# Patient Record
Sex: FEMALE | Race: OTHER | NOT HISPANIC OR LATINO
[De-identification: names, ages, dates, MRNs, and addresses within clinical notes are randomized per-mention and may not be internally consistent; named-entity substitution may affect disease eponyms.]

---

## 2024-07-22 PROBLEM — M17.11 ARTHRITIS OF KNEE, RIGHT: Status: ACTIVE | Noted: 2024-07-22

## 2024-08-21 PROBLEM — S80.01XA CONTUSION OF RIGHT KNEE, INITIAL ENCOUNTER: Status: ACTIVE | Noted: 2024-08-21

## 2024-11-05 ENCOUNTER — APPOINTMENT (OUTPATIENT)
Dept: ORTHOPEDIC SURGERY | Facility: CLINIC | Age: 64
End: 2024-11-05

## 2024-11-18 ENCOUNTER — APPOINTMENT (OUTPATIENT)
Dept: ORTHOPEDIC SURGERY | Facility: CLINIC | Age: 64
End: 2024-11-18

## 2024-11-21 ENCOUNTER — NON-APPOINTMENT (OUTPATIENT)
Age: 64
End: 2024-11-21

## 2024-12-28 ENCOUNTER — NON-APPOINTMENT (OUTPATIENT)
Age: 64
End: 2024-12-28

## 2024-12-30 ENCOUNTER — APPOINTMENT (OUTPATIENT)
Dept: ORTHOPEDIC SURGERY | Facility: CLINIC | Age: 64
End: 2024-12-30
Payer: OTHER MISCELLANEOUS

## 2024-12-30 VITALS — HEIGHT: 67 IN | BODY MASS INDEX: 39.71 KG/M2 | WEIGHT: 253 LBS

## 2024-12-30 DIAGNOSIS — M17.11 UNILATERAL PRIMARY OSTEOARTHRITIS, RIGHT KNEE: ICD-10-CM

## 2024-12-30 PROCEDURE — 99214 OFFICE O/P EST MOD 30 MIN: CPT | Mod: ACP

## 2024-12-30 RX ORDER — MELOXICAM 15 MG/1
15 TABLET ORAL
Qty: 30 | Refills: 3 | Status: ACTIVE | COMMUNITY
Start: 2024-12-30 | End: 2025-04-29

## 2025-01-27 ENCOUNTER — APPOINTMENT (OUTPATIENT)
Dept: ORTHOPEDIC SURGERY | Facility: CLINIC | Age: 65
End: 2025-01-27

## 2025-01-29 ENCOUNTER — APPOINTMENT (OUTPATIENT)
Dept: ORTHOPEDIC SURGERY | Facility: CLINIC | Age: 65
End: 2025-01-29
Payer: OTHER MISCELLANEOUS

## 2025-01-29 VITALS — BODY MASS INDEX: 39.71 KG/M2 | WEIGHT: 253 LBS | HEIGHT: 67 IN

## 2025-01-29 DIAGNOSIS — S80.01XA CONTUSION OF RIGHT KNEE, INITIAL ENCOUNTER: ICD-10-CM

## 2025-01-29 PROCEDURE — 99214 OFFICE O/P EST MOD 30 MIN: CPT

## 2025-01-29 RX ORDER — MELOXICAM 15 MG/1
15 TABLET ORAL
Qty: 30 | Refills: 1 | Status: ACTIVE | COMMUNITY
Start: 2025-01-29 | End: 1900-01-01

## 2025-02-07 ENCOUNTER — APPOINTMENT (OUTPATIENT)
Dept: ORTHOPEDIC SURGERY | Facility: CLINIC | Age: 65
End: 2025-02-07

## 2025-02-14 ENCOUNTER — APPOINTMENT (OUTPATIENT)
Dept: ORTHOPEDIC SURGERY | Facility: CLINIC | Age: 65
End: 2025-02-14

## 2025-02-28 ENCOUNTER — APPOINTMENT (OUTPATIENT)
Dept: ORTHOPEDIC SURGERY | Facility: CLINIC | Age: 65
End: 2025-02-28
Payer: OTHER MISCELLANEOUS

## 2025-02-28 VITALS — WEIGHT: 253 LBS | BODY MASS INDEX: 39.71 KG/M2 | HEIGHT: 67 IN

## 2025-02-28 DIAGNOSIS — M43.17 SPONDYLOLISTHESIS, LUMBOSACRAL REGION: ICD-10-CM

## 2025-02-28 DIAGNOSIS — S39.012A STRAIN OF MUSCLE, FASCIA AND TENDON OF LOWER BACK, INITIAL ENCOUNTER: ICD-10-CM

## 2025-02-28 PROCEDURE — 99213 OFFICE O/P EST LOW 20 MIN: CPT

## 2025-03-05 ENCOUNTER — INPATIENT (INPATIENT)
Facility: HOSPITAL | Age: 65
LOS: 1 days | Discharge: HOME HEALTH | DRG: 313 | End: 2025-03-07
Attending: INTERNAL MEDICINE | Admitting: INTERNAL MEDICINE
Payer: COMMERCIAL

## 2025-03-05 VITALS
TEMPERATURE: 98 F | SYSTOLIC BLOOD PRESSURE: 138 MMHG | RESPIRATION RATE: 16 BRPM | DIASTOLIC BLOOD PRESSURE: 83 MMHG | HEART RATE: 65 BPM | OXYGEN SATURATION: 98 % | HEIGHT: 67 IN | WEIGHT: 240.97 LBS

## 2025-03-05 DIAGNOSIS — E78.5 HYPERLIPIDEMIA, UNSPECIFIED: ICD-10-CM

## 2025-03-05 DIAGNOSIS — I25.10 ATHEROSCLEROTIC HEART DISEASE OF NATIVE CORONARY ARTERY WITHOUT ANGINA PECTORIS: ICD-10-CM

## 2025-03-05 DIAGNOSIS — R07.9 CHEST PAIN, UNSPECIFIED: ICD-10-CM

## 2025-03-05 DIAGNOSIS — I10 ESSENTIAL (PRIMARY) HYPERTENSION: ICD-10-CM

## 2025-03-05 LAB
ALBUMIN SERPL ELPH-MCNC: 4.1 G/DL — SIGNIFICANT CHANGE UP (ref 3.3–5)
ALP SERPL-CCNC: 61 U/L — SIGNIFICANT CHANGE UP (ref 40–120)
ALT FLD-CCNC: 14 U/L — SIGNIFICANT CHANGE UP (ref 10–45)
ANION GAP SERPL CALC-SCNC: 10 MMOL/L — SIGNIFICANT CHANGE UP (ref 5–17)
APTT BLD: 31.9 SEC — SIGNIFICANT CHANGE UP (ref 24.5–35.6)
AST SERPL-CCNC: 16 U/L — SIGNIFICANT CHANGE UP (ref 10–40)
BASOPHILS # BLD AUTO: 0.12 K/UL — SIGNIFICANT CHANGE UP (ref 0–0.2)
BASOPHILS NFR BLD AUTO: 1.7 % — SIGNIFICANT CHANGE UP (ref 0–2)
BILIRUB SERPL-MCNC: 0.3 MG/DL — SIGNIFICANT CHANGE UP (ref 0.2–1.2)
BUN SERPL-MCNC: 14 MG/DL — SIGNIFICANT CHANGE UP (ref 7–23)
CALCIUM SERPL-MCNC: 9.8 MG/DL — SIGNIFICANT CHANGE UP (ref 8.4–10.5)
CHLORIDE SERPL-SCNC: 101 MMOL/L — SIGNIFICANT CHANGE UP (ref 96–108)
CO2 SERPL-SCNC: 29 MMOL/L — SIGNIFICANT CHANGE UP (ref 22–31)
CREAT SERPL-MCNC: 0.79 MG/DL — SIGNIFICANT CHANGE UP (ref 0.5–1.3)
EGFR: 84 ML/MIN/1.73M2 — SIGNIFICANT CHANGE UP
EGFR: 84 ML/MIN/1.73M2 — SIGNIFICANT CHANGE UP
EOSINOPHIL # BLD AUTO: 0.55 K/UL — HIGH (ref 0–0.5)
EOSINOPHIL NFR BLD AUTO: 7.5 % — HIGH (ref 0–6)
GLUCOSE SERPL-MCNC: 87 MG/DL — SIGNIFICANT CHANGE UP (ref 70–99)
HCT VFR BLD CALC: 39.8 % — SIGNIFICANT CHANGE UP (ref 34.5–45)
HGB BLD-MCNC: 12.7 G/DL — SIGNIFICANT CHANGE UP (ref 11.5–15.5)
INR BLD: 1.04 RATIO — SIGNIFICANT CHANGE UP (ref 0.85–1.16)
LYMPHOCYTES # BLD AUTO: 2.25 K/UL — SIGNIFICANT CHANGE UP (ref 1–3.3)
LYMPHOCYTES # BLD AUTO: 30.8 % — SIGNIFICANT CHANGE UP (ref 13–44)
MANUAL SMEAR VERIFICATION: SIGNIFICANT CHANGE UP
MCHC RBC-ENTMCNC: 25.6 PG — LOW (ref 27–34)
MCHC RBC-ENTMCNC: 31.9 G/DL — LOW (ref 32–36)
MCV RBC AUTO: 80.2 FL — SIGNIFICANT CHANGE UP (ref 80–100)
MONOCYTES # BLD AUTO: 0.73 K/UL — SIGNIFICANT CHANGE UP (ref 0–0.9)
MONOCYTES NFR BLD AUTO: 10 % — SIGNIFICANT CHANGE UP (ref 2–14)
NEUTROPHILS # BLD AUTO: 3.65 K/UL — SIGNIFICANT CHANGE UP (ref 1.8–7.4)
NEUTROPHILS NFR BLD AUTO: 50 % — SIGNIFICANT CHANGE UP (ref 43–77)
PLAT MORPH BLD: NORMAL — SIGNIFICANT CHANGE UP
PLATELET # BLD AUTO: 290 K/UL — SIGNIFICANT CHANGE UP (ref 150–400)
POLYCHROMASIA BLD QL SMEAR: SLIGHT — SIGNIFICANT CHANGE UP
POTASSIUM SERPL-MCNC: 3.9 MMOL/L — SIGNIFICANT CHANGE UP (ref 3.5–5.3)
POTASSIUM SERPL-SCNC: 3.9 MMOL/L — SIGNIFICANT CHANGE UP (ref 3.5–5.3)
PROT SERPL-MCNC: 8 G/DL — SIGNIFICANT CHANGE UP (ref 6–8.3)
PROTHROM AB SERPL-ACNC: 11.9 SEC — SIGNIFICANT CHANGE UP (ref 9.9–13.4)
RBC # BLD: 4.96 M/UL — SIGNIFICANT CHANGE UP (ref 3.8–5.2)
RBC # FLD: 14.9 % — HIGH (ref 10.3–14.5)
RBC BLD AUTO: SIGNIFICANT CHANGE UP
SODIUM SERPL-SCNC: 140 MMOL/L — SIGNIFICANT CHANGE UP (ref 135–145)
TROPONIN T, HIGH SENSITIVITY RESULT: <6 NG/L — SIGNIFICANT CHANGE UP (ref 0–51)
TROPONIN T, HIGH SENSITIVITY RESULT: <6 NG/L — SIGNIFICANT CHANGE UP (ref 0–51)
WBC # BLD: 7.29 K/UL — SIGNIFICANT CHANGE UP (ref 3.8–10.5)
WBC # FLD AUTO: 7.29 K/UL — SIGNIFICANT CHANGE UP (ref 3.8–10.5)

## 2025-03-05 PROCEDURE — 71045 X-RAY EXAM CHEST 1 VIEW: CPT | Mod: 26

## 2025-03-05 PROCEDURE — 99285 EMERGENCY DEPT VISIT HI MDM: CPT

## 2025-03-05 RX ORDER — AMLODIPINE BESYLATE 10 MG/1
1 TABLET ORAL
Refills: 0 | DISCHARGE

## 2025-03-05 RX ORDER — ASPIRIN 325 MG
81 TABLET ORAL DAILY
Refills: 0 | Status: DISCONTINUED | OUTPATIENT
Start: 2025-03-05 | End: 2025-03-07

## 2025-03-05 RX ORDER — AMLODIPINE BESYLATE 10 MG/1
5 TABLET ORAL DAILY
Refills: 0 | Status: DISCONTINUED | OUTPATIENT
Start: 2025-03-05 | End: 2025-03-07

## 2025-03-05 RX ORDER — ROSUVASTATIN CALCIUM 20 MG/1
5 TABLET, FILM COATED ORAL AT BEDTIME
Refills: 0 | Status: DISCONTINUED | OUTPATIENT
Start: 2025-03-05 | End: 2025-03-07

## 2025-03-05 RX ORDER — IBANDRONATE SODIUM 150 MG/1
1 TABLET ORAL
Refills: 0 | DISCHARGE

## 2025-03-05 RX ORDER — ROSUVASTATIN CALCIUM 20 MG/1
1 TABLET, FILM COATED ORAL
Refills: 0 | DISCHARGE

## 2025-03-05 RX ORDER — ASPIRIN 325 MG
243 TABLET ORAL ONCE
Refills: 0 | Status: COMPLETED | OUTPATIENT
Start: 2025-03-05 | End: 2025-03-05

## 2025-03-05 RX ORDER — ENOXAPARIN SODIUM 100 MG/ML
40 INJECTION SUBCUTANEOUS EVERY 24 HOURS
Refills: 0 | Status: DISCONTINUED | OUTPATIENT
Start: 2025-03-05 | End: 2025-03-07

## 2025-03-05 RX ORDER — ASPIRIN 325 MG
1 TABLET ORAL
Refills: 0 | DISCHARGE

## 2025-03-05 RX ORDER — MELOXICAM 15 MG/1
1 TABLET ORAL
Refills: 0 | DISCHARGE

## 2025-03-05 RX ADMIN — ENOXAPARIN SODIUM 40 MILLIGRAM(S): 100 INJECTION SUBCUTANEOUS at 22:48

## 2025-03-05 RX ADMIN — AMLODIPINE BESYLATE 5 MILLIGRAM(S): 10 TABLET ORAL at 22:47

## 2025-03-05 RX ADMIN — ROSUVASTATIN CALCIUM 5 MILLIGRAM(S): 20 TABLET, FILM COATED ORAL at 22:48

## 2025-03-05 RX ADMIN — Medication 243 MILLIGRAM(S): at 15:25

## 2025-03-05 NOTE — H&P ADULT - HISTORY OF PRESENT ILLNESS
65 y/o female h/o htn, chol, ?cad, presents to ED c/o chest pain and jaw pain, went to her doctor today and had an episode of dizziness at office and was sent to ED. Pt denies chest pain at present.   last cath 2011. normal

## 2025-03-05 NOTE — CONSULT NOTE ADULT - SUBJECTIVE AND OBJECTIVE BOX
CHIEF COMPLAINT:    HISTORY OF PRESENT ILLNESS:  63 y/o female presents to ED c/o chest pain and jaw pain, went to her doctor today and had an episode of dizziness at office and was sent to ED. Pt denies chest pain at present. PMH of HTN HLD CAD with 40% blockage on angio in past, no stents. A&Ox4, breathing unlabored, abd soft nontender nondistended, NSR on cardiac monitor, skin warm dry and intact.    PAST MEDICAL & SURGICAL HISTORY:  HTN (Hypertension)      H/O:               MEDICATIONS:                  FAMILY HISTORY:      SOCIAL HISTORY:    [ ] Non-smoker  [ ] Smoker  [ ] Alcohol    Allergies    pcn (Hives)  penicillin (Rash)  shellfish (Anaphylaxis)    Intolerances    	    REVIEW OF SYSTEMS:  CONSTITUTIONAL: No fever, weight loss, or fatigue  EYES: No eye pain, visual disturbances, or discharge  ENMT:  No difficulty hearing, tinnitus, vertigo; No sinus or throat pain  NECK: No pain or stiffness  RESPIRATORY: No cough, wheezing, chills or hemoptysis; No Shortness of Breath  CARDIOVASCULAR: No chest pain, palpitations, passing out, dizziness, or leg swelling  GASTROINTESTINAL: No abdominal or epigastric pain. No nausea, vomiting, or hematemesis; No diarrhea or constipation. No melena or hematochezia.  GENITOURINARY: No dysuria, frequency, hematuria, or incontinence  NEUROLOGICAL: No headaches, memory loss, loss of strength, numbness, or tremors  SKIN: No itching, burning, rashes, or lesions   LYMPH Nodes: No enlarged glands  ENDOCRINE: No heat or cold intolerance; No hair loss  MUSCULOSKELETAL: No joint pain or swelling; No muscle, back, or extremity pain  PSYCHIATRIC: No depression, anxiety, mood swings, or difficulty sleeping  HEME/LYMPH: No easy bruising, or bleeding gums  ALLERY AND IMMUNOLOGIC: No hives or eczema	    [ ] All others negative	  [ ] Unable to obtain    PHYSICAL EXAM:  T(C): 36.5 (25 @ 14:25), Max: 36.5 (25 @ 14:25)  HR: 66 (25 @ 15:40) (65 - 66)  BP: 134/74 (25 @ 15:40) (134/74 - 138/83)  RR: 20 (25 @ 15:40) (16 - 20)  SpO2: 99% (25 @ 15:40) (98% - 99%)  Wt(kg): --  I&O's Summary      Appearance: Normal	  HEENT:   Normal oral mucosa, PERRL, EOMI	  Lymphatic: No lymphadenopathy  Cardiovascular: Normal S1 S2, No JVD, No murmurs, No edema  Respiratory: Lungs clear to auscultation	  Psychiatry: A & O x 3, Mood & affect appropriate  Gastrointestinal:  Soft, Non-tender, + BS	  Skin: No rashes, No ecchymoses, No cyanosis	  Neurologic: Non-focal  Extremities: Normal range of motion, No clubbing, cyanosis or edema  Vascular: Peripheral pulses palpable 2+ bilaterally    TELEMETRY: 	SR   ECG:  	ECG recorded at 3:18 PM independently interpreted by Dr Alessandro Bacon,  at 3:22 PM shows normal sinus rhythm left axis deviation normal intervals, previous hint of elevation in aVL is no longer present, still some slight sagging of ST segments in the inferior leads, very slight sagging of ST segments V4 V5 and V6.    RADIOLOGY:  OTHER: 	  	  LABS:	 	    CARDIAC MARKERS:  Troponin T, High Sensitivity Result: <6: *                                 12.7   7.29  )-----------( 290      ( 05 Mar 2025 15:23 )             39.8     03-05    140  |  101  |  14  ----------------------------<  87  3.9   |  29  |  0.79    Ca    9.8      05 Mar 2025 15:23    TPro  8.0  /  Alb  4.1  /  TBili  0.3  /  DBili  x   /  AST  16  /  ALT  14  /  AlkPhos  61  -05    proBNP:   Lipid Profile:   HgA1c:   TSH:

## 2025-03-05 NOTE — ED ADULT NURSE NOTE - OBJECTIVE STATEMENT
65 y/o female presents to ED c/o chest pain and jaw pain, went to her doctor today and had an episode of dizziness at office and was sent to ED. Pt denies chest pain at present. PMH of HTN HLD 63 y/o female presents to ED c/o chest pain and jaw pain, went to her doctor today and had an episode of dizziness at office and was sent to ED. Pt denies chest pain at present. PMH of HTN HLD CAD with 40% blockage on angio in past, no stents. A&Ox4, breathing unlabored, abd soft nontender nondistended, NSR on cardiac monitor, skin warm dry and intact.

## 2025-03-05 NOTE — CONSULT NOTE ADULT - ASSESSMENT
65 y/o female presents to ED c/o chest pain and jaw pain, went to her doctor today and had an episode of dizziness at office and was sent to ED. Pt denies chest pain at present. PMH of HTN HLD CAD with 40% blockage on angio in past, no stents. A&Ox4, breathing unlabored, abd soft nontender nondistended, NSR on cardiac monitor, skin warm dry and intact.

## 2025-03-05 NOTE — ED PROVIDER NOTE - ATTENDING APP SHARED VISIT CONTRIBUTION OF CARE
Attending MD Bacon: I personally made/approved the management plan and take responsibility for the patient management.          *The above represents an initial assessment/impression. Please refer to progress notes for potential changes in patient clinical course*

## 2025-03-05 NOTE — H&P ADULT - NSHPPHYSICALEXAM_GEN_ALL_CORE
Vital Signs Last 24 Hrs  T(C): 37.2 (05 Mar 2025 18:32), Max: 37.2 (05 Mar 2025 18:32)  T(F): 98.9 (05 Mar 2025 18:32), Max: 98.9 (05 Mar 2025 18:32)  HR: 63 (05 Mar 2025 18:32) (63 - 66)  BP: 115/63 (05 Mar 2025 18:32) (115/63 - 138/83)  BP(mean): --  RR: 20 (05 Mar 2025 18:32) (16 - 20)  SpO2: 98% (05 Mar 2025 18:32) (98% - 99%)    Parameters below as of 05 Mar 2025 18:32  Patient On (Oxygen Delivery Method): room air      PHYSICAL EXAM:  GENERAL: NAD, well-developed  HEAD:  Atraumatic, Normocephalic  EYES: EOMI, PERRLA, conjunctiva and sclera clear  NECK: Supple, No JVD  CHEST/LUNG: Clear to auscultation bilaterally; No wheeze  HEART: Regular rate and rhythm; No murmurs, rubs, or gallops  ABDOMEN: Soft, Nontender, Nondistended; Bowel sounds present  EXTREMITIES:  2+ Peripheral Pulses, No clubbing, cyanosis, or edema  PSYCH: AAOx3  NEUROLOGY: non-focal  SKIN: No rashes or lesions

## 2025-03-05 NOTE — PATIENT PROFILE ADULT - FALL HARM RISK - HARM RISK INTERVENTIONS

## 2025-03-05 NOTE — ED PROVIDER NOTE - CLINICAL SUMMARY MEDICAL DECISION MAKING FREE TEXT BOX
Attending MD Bacon:  Onset of chest pain at 11:00 AM today. Pain described as heavy sensation in center of chest radiating to jaw and shoulders. Pain severity rated 10/10 at onset, currently 6-7/10. No back pain. Denies shortness of breath, nausea, or vomiting. No associated sweating. Pain occurred while sitting at rest. Similar episode occurred a few months ago once, today experienced twice. Called primary care physician immediately and had episode of dizziness during office visit, prompting EMS call.    Primary Care Doctor: Dr. Kolb at [facility]    Cardiologist: Dr. Aries Briones at [facility]    Past Medical History: Coronary artery disease with 40% blockage on angiogram 8 years ago, no stent placement. Hypertension, Hypercholesterolemia    Allergies: Shellfish, Penicillin, Latex    Medication History:  - Baby aspirin  - Amlodipine  - Valsartan  - Atorvastatin  - Boniva    Patient's vital signs are nonactionable.  She is sitting in the stretcher in no apparent distress but appears may be slightly tachypneic to my eye.  High BMI.  Clear lungs posteriorly.  Regular heart sounds without obvious murmur anteriorly.  Pulses are full and equal in the bilateral upper and lower extremities.  Extremities are warm to the touch.  Moves all extremity spontaneously.    ECG recorded at 2:45 PM independently interpreted by me , Dr Alessandro Bacon,  at 3:18 PM shows normal sinus rhythm left axis deviation T wave inversion with slight sagging of ST segment in lead II lead III and lead aVF, very subtle less than 1 mm elevation lead aVL, no ST elevation elsewhere, artifact in V1 and V2 limits interpretation of these leads.    Patient is presenting with acute chest pain with history of coronary disease hypertension hyperlipidemia, description of pain is highly concerning and initial EKG with may be very subtle elevation in in lead aVL, will need to repeat EKG at this time given ongoing chest pain will load with aspirin obtain cardiac biomarkers and close reassessment.  Clinical history is not consistent with pulmonary embolism or dissection.      *The above represents an initial assessment/impression. Please refer to progress notes for potential changes in patient clinical course*

## 2025-03-05 NOTE — ED PROVIDER NOTE - OTHER FINDINGS
ECG recorded at 3:18 PM independently interpreted by me , Dr Alessandro Bacon,  at 3:22 PM shows normal sinus rhythm left axis deviation normal intervals, previous hint of elevation in aVL is no longer present, still some slight sagging of ST segments in the inferior leads, very slight sagging of ST segments V4 V5 and V6.

## 2025-03-05 NOTE — ED ADULT NURSE NOTE - NSFALLRISKINTERV_ED_ALL_ED
Assistance OOB with selected safe patient handling equipment if applicable/Assistance with ambulation/Communicate fall risk and risk factors to all staff, patient, and family/Monitor gait and stability/Provide visual cue: yellow wristband, yellow gown, etc/Reinforce activity limits and safety measures with patient and family/Call bell, personal items and telephone in reach/Instruct patient to call for assistance before getting out of bed/chair/stretcher/Non-slip footwear applied when patient is off stretcher/[place] to call system/Physically safe environment - no spills, clutter or unnecessary equipment/Purposeful Proactive Rounding/Room/bathroom lighting operational, light cord in reach

## 2025-03-06 ENCOUNTER — RESULT REVIEW (OUTPATIENT)
Age: 65
End: 2025-03-06

## 2025-03-06 LAB
ANION GAP SERPL CALC-SCNC: 13 MMOL/L — SIGNIFICANT CHANGE UP (ref 5–17)
BUN SERPL-MCNC: 12 MG/DL — SIGNIFICANT CHANGE UP (ref 7–23)
CALCIUM SERPL-MCNC: 9.5 MG/DL — SIGNIFICANT CHANGE UP (ref 8.4–10.5)
CHLORIDE SERPL-SCNC: 103 MMOL/L — SIGNIFICANT CHANGE UP (ref 96–108)
CO2 SERPL-SCNC: 24 MMOL/L — SIGNIFICANT CHANGE UP (ref 22–31)
CREAT SERPL-MCNC: 0.78 MG/DL — SIGNIFICANT CHANGE UP (ref 0.5–1.3)
EGFR: 85 ML/MIN/1.73M2 — SIGNIFICANT CHANGE UP
EGFR: 85 ML/MIN/1.73M2 — SIGNIFICANT CHANGE UP
GLUCOSE SERPL-MCNC: 92 MG/DL — SIGNIFICANT CHANGE UP (ref 70–99)
HCT VFR BLD CALC: 36.2 % — SIGNIFICANT CHANGE UP (ref 34.5–45)
HGB BLD-MCNC: 11.8 G/DL — SIGNIFICANT CHANGE UP (ref 11.5–15.5)
MCHC RBC-ENTMCNC: 26 PG — LOW (ref 27–34)
MCHC RBC-ENTMCNC: 32.6 G/DL — SIGNIFICANT CHANGE UP (ref 32–36)
MCV RBC AUTO: 79.7 FL — LOW (ref 80–100)
NRBC BLD AUTO-RTO: 0 /100 WBCS — SIGNIFICANT CHANGE UP (ref 0–0)
PLATELET # BLD AUTO: 282 K/UL — SIGNIFICANT CHANGE UP (ref 150–400)
POTASSIUM SERPL-MCNC: 3.8 MMOL/L — SIGNIFICANT CHANGE UP (ref 3.5–5.3)
POTASSIUM SERPL-SCNC: 3.8 MMOL/L — SIGNIFICANT CHANGE UP (ref 3.5–5.3)
RBC # BLD: 4.54 M/UL — SIGNIFICANT CHANGE UP (ref 3.8–5.2)
RBC # FLD: 14.7 % — HIGH (ref 10.3–14.5)
SODIUM SERPL-SCNC: 140 MMOL/L — SIGNIFICANT CHANGE UP (ref 135–145)
WBC # BLD: 6.08 K/UL — SIGNIFICANT CHANGE UP (ref 3.8–10.5)
WBC # FLD AUTO: 6.08 K/UL — SIGNIFICANT CHANGE UP (ref 3.8–10.5)

## 2025-03-06 PROCEDURE — 78452 HT MUSCLE IMAGE SPECT MULT: CPT | Mod: 26

## 2025-03-06 PROCEDURE — 93306 TTE W/DOPPLER COMPLETE: CPT | Mod: 26

## 2025-03-06 PROCEDURE — 93018 CV STRESS TEST I&R ONLY: CPT

## 2025-03-06 PROCEDURE — 93016 CV STRESS TEST SUPVJ ONLY: CPT

## 2025-03-06 RX ADMIN — Medication 40 MILLIGRAM(S): at 05:28

## 2025-03-06 RX ADMIN — AMLODIPINE BESYLATE 5 MILLIGRAM(S): 10 TABLET ORAL at 05:28

## 2025-03-06 RX ADMIN — Medication 81 MILLIGRAM(S): at 12:33

## 2025-03-06 RX ADMIN — ENOXAPARIN SODIUM 40 MILLIGRAM(S): 100 INJECTION SUBCUTANEOUS at 21:27

## 2025-03-06 RX ADMIN — Medication 160 MILLIGRAM(S): at 05:29

## 2025-03-06 RX ADMIN — ROSUVASTATIN CALCIUM 5 MILLIGRAM(S): 20 TABLET, FILM COATED ORAL at 21:27

## 2025-03-06 NOTE — DISCHARGE NOTE PROVIDER - NSDCCPCAREPLAN_GEN_ALL_CORE_FT
PRINCIPAL DISCHARGE DIAGNOSIS  Diagnosis: Chest pain  Assessment and Plan of Treatment: status post TTE- EF 68%, no wall motion abnormalities  status post stress test 3/6/25  followup with cardiology outpatient   HOME CARE INSTRUCTIONS  For the next few days, avoid physical activities that bring on chest pain. Continue physical activities as directed.  Do not smoke.  Avoid drinking alcohol.   Only take over-the-counter or prescription medicine for pain, discomfort, or fever as directed by your caregiver.  Follow your caregiver's suggestions for further testing if your chest pain does not go away.  Keep any follow-up appointments you made. If you do not go to an appointment, you could develop lasting (chronic) problems with pain. If there is any problem keeping an appointment, you must call to reschedule.   SEEK MEDICAL CARE IF:  You think you are having problems from the medicine you are taking. Read your medicine instructions carefully.  Your chest pain does not go away, even after treatment.  You develop a rash with blisters on your chest.  SEEK IMMEDIATE MEDICAL CARE IF:  You have increased chest pain or pain that spreads to your arm, neck, jaw, back, or abdomen.   You develop shortness of breath, an increasing cough, or you are coughing up blood.  You have severe back or abdominal pain, feel nauseous, or vomit.  You develop severe weakness, fainting, or chills.  You have a fever.  THIS IS AN EMERGENCY. Do not wait to see if the pain will go away. Get medical help at once. Call your local emergency services (_____________________). Do not drive yourself to the hospital.       PRINCIPAL DISCHARGE DIAGNOSIS  Diagnosis: Chest pain  Assessment and Plan of Treatment: status post TTE- EF 68%, no wall motion abnormalities  status post stress test 3/6/25 abnormal and status post diagnostic left heart cath 3/7- med management   followup with cardiology outpatient   HOME CARE INSTRUCTIONS  For the next few days, avoid physical activities that bring on chest pain. Continue physical activities as directed.  Do not smoke.  Avoid drinking alcohol.   Only take over-the-counter or prescription medicine for pain, discomfort, or fever as directed by your caregiver.  Follow your caregiver's suggestions for further testing if your chest pain does not go away.  Keep any follow-up appointments you made. If you do not go to an appointment, you could develop lasting (chronic) problems with pain. If there is any problem keeping an appointment, you must call to reschedule.   SEEK MEDICAL CARE IF:  You think you are having problems from the medicine you are taking. Read your medicine instructions carefully.  Your chest pain does not go away, even after treatment.  You develop a rash with blisters on your chest.  SEEK IMMEDIATE MEDICAL CARE IF:  You have increased chest pain or pain that spreads to your arm, neck, jaw, back, or abdomen.   You develop shortness of breath, an increasing cough, or you are coughing up blood.  You have severe back or abdominal pain, feel nauseous, or vomit.  You develop severe weakness, fainting, or chills.  You have a fever.  THIS IS AN EMERGENCY. Do not wait to see if the pain will go away. Get medical help at once. Call your local emergency services (_____________________). Do not drive yourself to the hospital.

## 2025-03-06 NOTE — DISCHARGE NOTE PROVIDER - HOSPITAL COURSE
HPI:  65 y/o female h/o htn, chol, ?cad, presents to ED c/o chest pain and jaw pain, went to her doctor today and had an episode of dizziness at office and was sent to ED. Pt denies chest pain at present.   last cath 2011. normal  (05 Mar 2025 19:36)    Hospital Course:  Pt presented for chest pain. Trops negative x2. Evaled by cardiology and rec for TTE and stress test. TTE noted for ejection fraction of 68 %,There are no regional wall motion abnormalities seen. Pt s/p stress test 3/6 ______________. Pt rec to c/w home meds for hx of HTN. Outpt f/u PCP and cards outpatient.     Important Medication Changes and Reason:  - see med rec    Active or Pending Issues Requiring Follow-up:  - PCP, cardiology    Advanced Directives:   [X] Full code  [ ] DNR  [ ] Hospice    Discharge Diagnoses:  - chest pain        Discharge/Dispo/Med rec discussed with attending Dr. Gold. Patient medically cleared for discharge Home with outpatient follow up with PCP and cardiology            HPI:  63 y/o female h/o htn, chol, ?cad, presents to ED c/o chest pain and jaw pain, went to her doctor today and had an episode of dizziness at office and was sent to ED. Pt denies chest pain at present.   last cath 2011. normal  (05 Mar 2025 19:36)    Hospital Course:  Pt presented for chest pain. Trops negative x2. Evaled by cardiology and rec for TTE and stress test. TTE noted for ejection fraction of 68 %,There are no regional wall motion abnormalities seen. Pt s/p abnormal stress test 3/6. Pt s/p LHC 3/7- diagnostic via RRA, LAD 30%. Med management.  Pt rec to c/w home meds for hx of HTN. F/u PCP and cards outpatient.     Important Medication Changes and Reason:  - see med rec    Active or Pending Issues Requiring Follow-up:  - PCP, cardiology    Advanced Directives:   [X] Full code  [ ] DNR  [ ] Hospice    Discharge Diagnoses:  - chest pain        Discharge/Dispo/Med rec discussed with attending Dr. Gold. Patient medically cleared for discharge Home with outpatient follow up with PCP and cardiology

## 2025-03-06 NOTE — DISCHARGE NOTE PROVIDER - NSDCFUADDAPPT_GEN_ALL_CORE_FT
APPTS ARE READY TO BE MADE: [X] YES    Best Family or Patient Contact (if needed):    Additional Information about above appointments (if needed):    1: PCP  2: Cardiology   3:     Other comments or requests:    APPTS ARE READY TO BE MADE: [X] YES    Best Family or Patient Contact (if needed):    Additional Information about above appointments (if needed):    1: PCP  2: Cardiology   3:     Other comments or requests:     Patient was outreached but did not answer. A voicemail was left for the patient to return our call.   APPTS ARE READY TO BE MADE: [X] YES    Best Family or Patient Contact (if needed):    Additional Information about above appointments (if needed):    1: PCP  2: Cardiology   3:     Other comments or requests:     Patient informed us they already have secured a follow up appointment for Internal Medicine, which is not visible on [facility].    Patient informed us they already have secured a follow up appointment for Cardiology, which is not visible on [facility].

## 2025-03-06 NOTE — DISCHARGE NOTE PROVIDER - NSDCMRMEDTOKEN_GEN_ALL_CORE_FT
amLODIPine 2.5 mg oral tablet: 1 tab(s) orally 2 times a day  aspirin 81 mg oral delayed release tablet: 1 tab(s) orally once a day  ibandronate 150 mg oral tablet: 1 tab(s) orally once a month  meloxicam 15 mg oral tablet: 1 tab(s) orally once a day as needed for pain  pantoprazole 40 mg oral delayed release tablet: 1 tab(s) orally once a day  rosuvastatin 5 mg oral tablet: 1 tab(s) orally once a day  valsartan-hydrochlorothiazide 160 mg-25 mg oral tablet: 1 tab(s) orally once a day

## 2025-03-06 NOTE — DISCHARGE NOTE PROVIDER - NSDCFUSCHEDAPPT_GEN_ALL_CORE_FT
Pio Jones  [facility]  [address] [place]  Scheduled Appointment: 03/19/2025    Jose Frazier  [facility]  [address]  Scheduled Appointment: 04/11/2025     Pio Jones  [facility]  [address] [place]  Scheduled Appointment: 05/07/2025

## 2025-03-06 NOTE — DISCHARGE NOTE PROVIDER - CARE PROVIDERS DIRECT ADDRESSES
[email or web address]

## 2025-03-07 VITALS
HEART RATE: 68 BPM | SYSTOLIC BLOOD PRESSURE: 144 MMHG | DIASTOLIC BLOOD PRESSURE: 84 MMHG | OXYGEN SATURATION: 99 % | RESPIRATION RATE: 18 BRPM

## 2025-03-07 PROCEDURE — 99152 MOD SED SAME PHYS/QHP 5/>YRS: CPT

## 2025-03-07 PROCEDURE — 84484 ASSAY OF TROPONIN QUANT: CPT

## 2025-03-07 PROCEDURE — 80053 COMPREHEN METABOLIC PANEL: CPT

## 2025-03-07 PROCEDURE — A9500: CPT

## 2025-03-07 PROCEDURE — 85730 THROMBOPLASTIN TIME PARTIAL: CPT

## 2025-03-07 PROCEDURE — C1769: CPT

## 2025-03-07 PROCEDURE — 85025 COMPLETE CBC W/AUTO DIFF WBC: CPT

## 2025-03-07 PROCEDURE — 93454 CORONARY ARTERY ANGIO S&I: CPT | Mod: 26

## 2025-03-07 PROCEDURE — C1887: CPT

## 2025-03-07 PROCEDURE — 93454 CORONARY ARTERY ANGIO S&I: CPT

## 2025-03-07 PROCEDURE — 85027 COMPLETE CBC AUTOMATED: CPT

## 2025-03-07 PROCEDURE — 93017 CV STRESS TEST TRACING ONLY: CPT

## 2025-03-07 PROCEDURE — C1894: CPT

## 2025-03-07 PROCEDURE — 78452 HT MUSCLE IMAGE SPECT MULT: CPT | Mod: MC

## 2025-03-07 PROCEDURE — 93306 TTE W/DOPPLER COMPLETE: CPT

## 2025-03-07 PROCEDURE — 80048 BASIC METABOLIC PNL TOTAL CA: CPT

## 2025-03-07 PROCEDURE — 71045 X-RAY EXAM CHEST 1 VIEW: CPT

## 2025-03-07 PROCEDURE — 99285 EMERGENCY DEPT VISIT HI MDM: CPT

## 2025-03-07 PROCEDURE — 36415 COLL VENOUS BLD VENIPUNCTURE: CPT

## 2025-03-07 PROCEDURE — 85610 PROTHROMBIN TIME: CPT

## 2025-03-07 RX ADMIN — Medication 160 MILLIGRAM(S): at 05:02

## 2025-03-07 RX ADMIN — AMLODIPINE BESYLATE 5 MILLIGRAM(S): 10 TABLET ORAL at 05:02

## 2025-03-07 RX ADMIN — Medication 40 MILLIGRAM(S): at 05:02

## 2025-03-07 RX ADMIN — Medication 81 MILLIGRAM(S): at 11:49

## 2025-03-07 NOTE — DISCHARGE NOTE NURSING/CASE MANAGEMENT/SOCIAL WORK - FINANCIAL ASSISTANCE
[facility] provides services at a reduced cost to those who are determined to be eligible through [facility]’s financial assistance program. Information regarding [facility]’s financial assistance program can be found by going to [email or web address] or by calling [phone].

## 2025-03-07 NOTE — PROGRESS NOTE ADULT - ASSESSMENT
64 female ho htn, chol, here with chest pain    chest pain  cards consulted   acs ruled out with trop neg x 2  tele   TTE noted    stress test noted abnl  plan for cardiac cath as per cards   ASA    htn  cont home meds  monitor    chol  cont statin      dvt ppx      Advanced care planning was discussed with patient and family.  Advanced care planning forms were reviewed and discussed as appropriate.  Differential diagnosis and plan of care discussed with patient after the evaluation.   Pain assessed and judicious use of narcotics when appropriate was discussed.  Importance of Fall prevention discussed.  Counseling on Smoking and Alcohol cessation was offered when appropriate.  Counseling on Diet, exercise, and medication compliance was done.         
64 female ho htn, chol, here with chest pain    chest pain  cards consulted   acs ruled out with trop neg x 2  tele   TTE noted    stress test noted abnl  plan for cardiac cath as per cards today   ASA    htn  cont home meds  monitor    chol  cont statin      dvt ppx      Advanced care planning was discussed with patient and family.  Advanced care planning forms were reviewed and discussed as appropriate.  Differential diagnosis and plan of care discussed with patient after the evaluation.   Pain assessed and judicious use of narcotics when appropriate was discussed.  Importance of Fall prevention discussed.  Counseling on Smoking and Alcohol cessation was offered when appropriate.  Counseling on Diet, exercise, and medication compliance was done.         
65 y/o female presents to ED c/o chest pain and jaw pain, went to her doctor today and had an episode of dizziness at office and was sent to ED. Pt denies chest pain at present. PMH of HTN HLD CAD with 40% blockage on angio in past, no stents. A&Ox4, breathing unlabored, abd soft nontender nondistended, NSR on cardiac monitor, skin warm dry and intact.
65 y/o female presents to ED c/o chest pain and jaw pain, went to her doctor today and had an episode of dizziness at office and was sent to ED. Pt denies chest pain at present. PMH of HTN HLD CAD with 40% blockage on angio in past, no stents. A&Ox4, breathing unlabored, abd soft nontender nondistended, NSR on cardiac monitor, skin warm dry and intact.

## 2025-03-07 NOTE — PROGRESS NOTE ADULT - TIME-BASED BILLING (NON-CRITICAL CARE)
Time-based billing (NON-critical care)
Time-based billing (NON-critical care)
Alternatives Discussed Intro (Do Not Add Period): I discussed alternative treatments to Mohs surgery and specifically discussed the risks and benefits of

## 2025-03-07 NOTE — PROGRESS NOTE ADULT - PROBLEM SELECTOR PLAN 2
Hx of angio 10 yrs ago showed 40% occlusion w/ no stents  last stress test was Dec 2023.
Hx of angio 10 yrs ago showed 40% occlusion w/ no stents  last stress test was Dec 2023.

## 2025-03-07 NOTE — DISCHARGE NOTE NURSING/CASE MANAGEMENT/SOCIAL WORK - PATIENT PORTAL LINK FT
You can access the FollowMyHealth Patient Portal offered by [facility] by registering at the following website: [email or web address]. By joining [email or web address] FollowMyHealth portal, you will also be able to view your health information using other applications (apps) compatible with our system.

## 2025-03-07 NOTE — PROGRESS NOTE ADULT - SUBJECTIVE AND OBJECTIVE BOX
RADHIKA VILLATORO  64y Female  MRN:[id number]    Patient is a 64y old  Female who presents with a chief complaint of Chest Pain  (06 Mar 2025 12:55)    HPI:  65 y/o female h/o htn, chol, ?cad, presents to ED c/o chest pain and jaw pain, went to her doctor today and had an episode of dizziness at office and was sent to ED. Pt denies chest pain at present.   last cath . normal  (05 Mar 2025 19:36)      Patient seen and evaluated at bedside. No acute events overnight except as noted.    Interval HPI: no acute events o/n     PAST MEDICAL & SURGICAL HISTORY:  HTN (Hypertension)      H/O:           REVIEW OF SYSTEMS:  as per hpi       Vital Signs Last 24 Hrs  T(C): 36.8 (06 Mar 2025 04:57), Max: 37.2 (05 Mar 2025 18:32)  T(F): 98.3 (06 Mar 2025 04:57), Max: 98.9 (05 Mar 2025 18:32)  HR: 58 (06 Mar 2025 04:57) (58 - 70)  BP: 124/69 (06 Mar 2025 04:57) (115/63 - 138/83)  BP(mean): --  RR: 18 (06 Mar 2025 04:57) (16 - 20)  SpO2: 94% (06 Mar 2025 04:57) (94% - 99%)    Parameters below as of 06 Mar 2025 04:57  Patient On (Oxygen Delivery Method): room air      CAPILLARY BLOOD GLUCOSE        I&O's Summary    05 Mar 2025 07:01  -  06 Mar 2025 07:00  --------------------------------------------------------  IN: 240 mL / OUT: 0 mL / NET: 240 mL        PHYSICAL EXAM:  GENERAL: NAD, well-developed  HEAD:  Atraumatic, Normocephalic  EYES: EOMI, PERRLA, conjunctiva and sclera clear  NECK: Supple, No JVD  CHEST/LUNG: Clear to auscultation bilaterally; No wheeze  HEART: S1, S2; No murmurs, rubs, or gallops  ABDOMEN: Soft, Nontender, Nondistended; Bowel sounds present  EXTREMITIES:  2+ Peripheral Pulses, No clubbing, cyanosis, or edema  PSYCH: Normal affect  NEUROLOGY: AAOX3; non-focal  SKIN: No rashes or lesions    Consultant(s) Notes Reviewed:  [x ] YES  [ ] NO  Care Discussed with Consultants/Other Providers [ x] YES  [ ] NO    MEDS:  MEDICATIONS  (STANDING):  amLODIPine   Tablet 5 milliGRAM(s) Oral daily  aspirin enteric coated 81 milliGRAM(s) Oral daily  enoxaparin Injectable 40 milliGRAM(s) SubCutaneous every 24 hours  hydrochlorothiazide 25 milliGRAM(s) Oral daily  pantoprazole    Tablet 40 milliGRAM(s) Oral before breakfast  rosuvastatin 5 milliGRAM(s) Oral at bedtime  valsartan 160 milliGRAM(s) Oral daily    MEDICATIONS  (PRN):    ALLERGIES:  pcn (Hives)  penicillin (Rash)  shellfish (Anaphylaxis)      LABS:                        11.8   6.08  )-----------( 282      ( 06 Mar 2025 06:17 )             36.2     03-06    140  |  103  |  12  ----------------------------<  92  3.8   |  24  |  0.78    Ca    9.5      06 Mar 2025 06:17    TPro  8.0  /  Alb  4.1  /  TBili  0.3  /  DBili  x   /  AST  16  /  ALT  14  /  AlkPhos  61  03-05    PT/INR - ( 05 Mar 2025 15:23 )   PT: 11.9 sec;   INR: 1.04 ratio         PTT - ( 05 Mar 2025 15:23 )  PTT:31.9 sec      LIVER FUNCTIONS - ( 05 Mar 2025 15:23 )  Alb: 4.1 g/dL / Pro: 8.0 g/dL / ALK PHOS: 61 U/L / ALT: 14 U/L / AST: 16 U/L / GGT: x           Urinalysis Basic - ( 06 Mar 2025 06:17 )    Color: x / Appearance: x / SG: x / pH: x  Gluc: 92 mg/dL / Ketone: x  / Bili: x / Urobili: x   Blood: x / Protein: x / Nitrite: x   Leuk Esterase: x / RBC: x / WBC x   Sq Epi: x / Non Sq Epi: x / Bacteria: x     < from: Xray Chest 1 View- PORTABLE-Urgent (Xray Chest 1 View- PORTABLE-Urgent .) (25 @ 17:08) >  IMPRESSION:  Clear lungs.    --- End of Report ---      < end of copied text >  < from: TTE W or WO Ultrasound Enhancing Agent (25 @ 08:01) >  _________________     CONCLUSIONS:      1. Left ventricular cavity is normal in size. Left ventricular wall thickness is normal. Left ventricular systolic function is normal with an ejection fraction of 68 % by Hollis's method of disks. There are no regional wall motion abnormalities seen.   2. Normal left ventricular diastolic function, with normal left ventricular filling pressure.   3. Normal right ventricular cavity size, with normal wall thickness, and normal right ventricular systolic function.   4. No pericardial effusion seen.   5. No prior echocardiogram is available for comparison.    __________________________________________________________________________________    < end of copied text >  < from: Nuclear Stress Test-Pharmacologic.. (25 @ 10:17) >  --------------------------------------------------------------------------------------------------------------------------------------------------------Conclusions:   1. Myocardial Perfusion: Abnormal.   2. Qualitative Perfusion:      -small-sized, mild defect(s) in the distal anterior and apical lateral walls that are reversible consistent with ischemia. - small-sized, moderate defect(s) in the basal inferolateral wall that is fixed consistent with an infarct.   3. The left ventricle is normal in function and normal in size. The post stress left ventricular EF is 66 %. The stress end diastolic volume is 103 ml and systolic volume is 35 ml.    ------------------------------------------------------------------------------------------------------------------------------    < end of copied text >  
RADHIKA VILLATORO  64y Female  MRN:[id number]    Patient is a 64y old  Female who presents with a chief complaint of Chest Pain  (06 Mar 2025 12:55)    HPI:  65 y/o female h/o htn, chol, ?cad, presents to ED c/o chest pain and jaw pain, went to her doctor today and had an episode of dizziness at office and was sent to ED. Pt denies chest pain at present.   last cath . normal  (05 Mar 2025 19:36)      Patient seen and evaluated at bedside. No acute events overnight except as noted.    Interval HPI: no acute events o/n     PAST MEDICAL & SURGICAL HISTORY:  HTN (Hypertension)      H/O:           REVIEW OF SYSTEMS:  as per hpi        Vital Signs Last 24 Hrs  T(C): 36.6 (07 Mar 2025 12:14), Max: 36.8 (06 Mar 2025 21:36)  T(F): 97.9 (07 Mar 2025 12:14), Max: 98.3 (06 Mar 2025 21:36)  HR: 65 (07 Mar 2025 12:14) (59 - 65)  BP: 156/66 (07 Mar 2025 12:14) (106/68 - 156/66)  BP(mean): 81 (06 Mar 2025 21:36) (81 - 81)  RR: 18 (07 Mar 2025 12:14) (18 - 18)  SpO2: 99% (07 Mar 2025 12:14) (94% - 99%)    Parameters below as of 07 Mar 2025 12:14  Patient On (Oxygen Delivery Method): room air         PHYSICAL EXAM:  GENERAL: NAD, well-developed  HEAD:  Atraumatic, Normocephalic  EYES: EOMI, PERRLA, conjunctiva and sclera clear  NECK: Supple, No JVD  CHEST/LUNG: Clear to auscultation bilaterally; No wheeze  HEART: S1, S2; No murmurs, rubs, or gallops  ABDOMEN: Soft, Nontender, Nondistended; Bowel sounds present  EXTREMITIES:  2+ Peripheral Pulses, No clubbing, cyanosis, or edema  PSYCH: Normal affect  NEUROLOGY: AAOX3; non-focal  SKIN: No rashes or lesions    Consultant(s) Notes Reviewed:  [x ] YES  [ ] NO  Care Discussed with Consultants/Other Providers [ x] YES  [ ] NO    MEDS:   MEDICATIONS  (STANDING):  amLODIPine   Tablet 5 milliGRAM(s) Oral daily  aspirin enteric coated 81 milliGRAM(s) Oral daily  enoxaparin Injectable 40 milliGRAM(s) SubCutaneous every 24 hours  hydrochlorothiazide 25 milliGRAM(s) Oral daily  pantoprazole    Tablet 40 milliGRAM(s) Oral before breakfast  rosuvastatin 5 milliGRAM(s) Oral at bedtime  sodium chloride 0.9% Bolus 250 milliLiter(s) IV Bolus once  sodium chloride 0.9%. 1000 milliLiter(s) (75 mL/Hr) IV Continuous <Continuous>  valsartan 160 milliGRAM(s) Oral daily    MEDICATIONS  (PRN):      ALLERGIES:  pcn (Hives)  penicillin (Rash)  shellfish (Anaphylaxis)      LABS:                                     11.8   6.08  )-----------( 282      ( 06 Mar 2025 06:17 )             36.2   03-06    140  |  103  |  12  ----------------------------<  92  3.8   |  24  |  0.78    Ca    9.5      06 Mar 2025 06:17    TPro  8.0  /  Alb  4.1  /  TBili  0.3  /  DBili  x   /  AST  16  /  ALT  14  /  AlkPhos  61  03-05       < from: Xray Chest 1 View- PORTABLE-Urgent (Xray Chest 1 View- PORTABLE-Urgent .) (25 @ 17:08) >  IMPRESSION:  Clear lungs.    --- End of Report ---      < end of copied text >  < from: TTE W or WO Ultrasound Enhancing Agent (25 @ 08:01) >  _________________     CONCLUSIONS:      1. Left ventricular cavity is normal in size. Left ventricular wall thickness is normal. Left ventricular systolic function is normal with an ejection fraction of 68 % by Hollis's method of disks. There are no regional wall motion abnormalities seen.   2. Normal left ventricular diastolic function, with normal left ventricular filling pressure.   3. Normal right ventricular cavity size, with normal wall thickness, and normal right ventricular systolic function.   4. No pericardial effusion seen.   5. No prior echocardiogram is available for comparison.    __________________________________________________________________________________    < end of copied text >  < from: Nuclear Stress Test-Pharmacologic.. (25 @ 10:17) >  --------------------------------------------------------------------------------------------------------------------------------------------------------Conclusions:   1. Myocardial Perfusion: Abnormal.   2. Qualitative Perfusion:      -small-sized, mild defect(s) in the distal anterior and apical lateral walls that are reversible consistent with ischemia. - small-sized, moderate defect(s) in the basal inferolateral wall that is fixed consistent with an infarct.   3. The left ventricle is normal in function and normal in size. The post stress left ventricular EF is 66 %. The stress end diastolic volume is 103 ml and systolic volume is 35 ml.    ------------------------------------------------------------------------------------------------------------------------------    < end of copied text >  
Subjective: Patient seen and examined. No new events except as noted.   No chest pain    REVIEW OF SYSTEMS:    CONSTITUTIONAL: +weakness, fevers or chills  EYES/ENT: No visual changes;  No vertigo or throat pain   NECK: No pain or stiffness  RESPIRATORY: No cough, wheezing, hemoptysis; No shortness of breath  CARDIOVASCULAR: No chest pain or palpitations  GASTROINTESTINAL: No abdominal or epigastric pain. No nausea, vomiting, or hematemesis; No diarrhea or constipation. No melena or hematochezia.  GENITOURINARY: No dysuria, frequency or hematuria  NEUROLOGICAL: No numbness or weakness  SKIN: No itching, burning, rashes, or lesions   All other review of systems is negative unless indicated above.    MEDICATIONS:  MEDICATIONS  (STANDING):  amLODIPine   Tablet 5 milliGRAM(s) Oral daily  aspirin enteric coated 81 milliGRAM(s) Oral daily  enoxaparin Injectable 40 milliGRAM(s) SubCutaneous every 24 hours  hydrochlorothiazide 25 milliGRAM(s) Oral daily  pantoprazole    Tablet 40 milliGRAM(s) Oral before breakfast  rosuvastatin 5 milliGRAM(s) Oral at bedtime  valsartan 160 milliGRAM(s) Oral daily      PHYSICAL EXAM:  T(C): 36.8 (03-06-25 @ 04:57), Max: 37.2 (03-05-25 @ 18:32)  HR: 58 (03-06-25 @ 04:57) (58 - 70)  BP: 124/69 (03-06-25 @ 04:57) (115/63 - 138/83)  RR: 18 (03-06-25 @ 04:57) (16 - 20)  SpO2: 94% (03-06-25 @ 04:57) (94% - 99%)  Wt(kg): --  I&O's Summary    05 Mar 2025 07:01  -  06 Mar 2025 07:00  --------------------------------------------------------  IN: 240 mL / OUT: 0 mL / NET: 240 mL      Height (cm): 170.2 (03-05 @ 14:25)  Weight (kg): 114.8 (03-05 @ 15:48)  BMI (kg/m2): 39.6 (03-05 @ 15:48)  BSA (m2): 2.24 (03-05 @ 15:48)    Appearance: Normal	  HEENT:   Normal oral mucosa, PERRL, EOMI	  Lymphatic: No lymphadenopathy , no edema  Cardiovascular: Normal S1 S2, No JVD, No murmurs , Peripheral pulses palpable 2+ bilaterally  Respiratory: Lungs clear to auscultation, normal effort 	  Gastrointestinal:  Soft, Non-tender, + BS	  Skin: No rashes, No ecchymoses, No cyanosis, warm to touch  Musculoskeletal: Normal range of motion, normal strength  Psychiatry:  Mood & affect appropriate  Ext: No edema      LABS:    CARDIAC MARKERS:                                11.8   6.08  )-----------( 282      ( 06 Mar 2025 06:17 )             36.2     03-06    140  |  103  |  12  ----------------------------<  92  3.8   |  24  |  0.78    Ca    9.5      06 Mar 2025 06:17    TPro  8.0  /  Alb  4.1  /  TBili  0.3  /  DBili  x   /  AST  16  /  ALT  14  /  AlkPhos  61  03-05    proBNP:   Lipid Profile:   HgA1c:   TSH:             TELEMETRY: 	SR    ECG:  	  RADIOLOGY:   DIAGNOSTIC TESTING:  [ ] Echocardiogram:  < from: TTE W or WO Ultrasound Enhancing Agent (03.06.25 @ 08:01) >    TRANSTHORACIC ECHOCARDIOGRAM REPORT  ________________________________________________________________________________                                      _______       Pt. Name:       RADHIKA VILLATORO Study Date:    3/6/2025  MRN:            [id number]         YOB: 1960  Accession #:    [id number]          Age:           64 years  Account#:       [id number]       Gender:        F  Heart Rate:     62 bpm             Height:        67.00 in (170.18 cm)  Rhythm:          Weight:        252.00 lb (114.31 kg)  Blood Pressure: 124/69 mmHg        BSA/BMI:       2.23 m² / 39.47 kg/m²  ________________________________________________________________________________________  Referring Physician:    [id number] Dre Gold  Interpreting Physician: Silvino Romero M.D.  Primary Sonographer:    Carolyn Shay RDCS    CPT:               ECHO TTE WO CON COMP W DOPP - 44924.m  Indication(s):     Chest pain, unspecified - R07.9  Procedure:         Transthoracic echocardiogram with 2-D, M-mode and complete                     spectral and color flow Doppler.  Ordering Location: [facility]  Admission Status:  Inpatient  Study Information: Image quality for this study is adequate.    _______________________________________________________________________________________     CONCLUSIONS:      1. Left ventricular cavity is normal in size. Left ventricular wall thickness is normal. Left ventricular systolic function is normal with an ejection fraction of 68 % by Hollis's method of disks. There are no regional wall motion abnormalities seen.   2. Normal left ventricular diastolic function, with normal left ventricular filling pressure.   3. Normal right ventricular cavity size, with normal wall thickness, and normal right ventricular systolic function.   4. No pericardial effusion seen.   5. No prior echocardiogram is available for comparison.    < end of copied text >  [ ]  Catheterization:  [ ] Stress Test:    < from: Nuclear Stress Test-Pharmacologic.. (03.06.25 @ 10:17) >    Nuclear Pharmacologic Stress Test Report         Patient: RADHIKA VILLATORO             Study Date: 3/6/2025           MRN: [id number]                 Birth Date/Age: 1960 Age: 64 years      Access #: [id number]                          Gender: F     Order Loc: 2DSU                               Height: 165.1 cm/ 65.0 in  Request Phys: [id number] Dre                  Weight: 108.86 kg/ 240.00 lb                Alla     Procedure: Rest|Stress Pharmacologic        BSA/ BMI: 2.14m²/ 39.94 kg/m²    Indication: Chest Pain - R07.9          Admiss Status: Inpatient    Fellow/ACP: Rosa Chilel                   Fellow/ACP:    ---------------------------------------------------------------------------------------------------------------------------------------------------------  Procedure Code: MYOCARDIAL SPECT MULTIPLE - 87289.m;TC 99M SESTAMIBI PER DOSE -                  .m;INJECTION REGADENOSON - .m;TC 99M SESTAMIBI PER                  DOSE 2nd - .m;STRESS TEST TRACING ONLY - 56737.m    ---------------------------------------------------------------------------------------------------------------------------------------------------------  History:       64 year old female h/o HTN, HLD presents for ischemic evalaution                 of chest pain and jaw pain.  Risk Factors:  Hypertension and dyslipidemia.  Image Quality: Good  Medications:   Aspirin, amlodipine, HCTZ, pantoprazole, crestor, valsartan,                 lovenox.  Allergies:     PCN and shellfish    --------------------------------------------------------------------------------------------------------------------------------------------------------Conclusions:   1. Myocardial Perfusion: Abnormal.   2. Qualitative Perfusion:      -small-sized, mild defect(s) in the distal anterior and apical lateral walls that are reversible consistent with ischemia. - small-sized, moderate defect(s) in the basal inferolateral wall that is fixed consistent with an infarct.   3. The left ventricle is normal in function and normal in size. The post stress left ventricular EF is 66 %. The stress end diastolic volume is 103 ml and systolic volume is 35 ml.    ---------------------------------------------------------------------------------------------------------------------------------------------------------     Stress Test Results:          Heart Rate: Resting 61 bpm  Pretest Chest Pain: None.       Pharmacological Stress Test Results:                Protocol: IV regadenoson (bolus injection, 400mcg over 10 to 20                          seconds followed by 5ml flush)          Procedure Time: 4 minutes              Heart Rate: Resting 61 bpm, Stress peak 86 bpm and (55 % max                          predicted).             HR Response: Normal.          Blood Pressure: Resting 142/92 mmHg and Stress max 141/82 mmHg.  Symptoms During Stress: Jaw pain; shortness of breath and headache.  Reason for Termination: Completion of protocol.       Protocol: Regadenoson Injection  +--------+--------------+----------------+--------------+  |  Time  |              |Heart Rate (bpm)|Blood Pressure|  +--------+--------------+----------------+--------------+  |Baseline|Pre-Injection |       61       |    142/92    |  +--------+--------------+----------------+--------------+  | 01:00  |Post Injection|       78       |    141/82    |  +--------+--------------+----------------+--------------+  | 02:00  |Post Injection|       85       |    135/86    |  +--------+--------------+----------------+--------------+  | 03:00  |Post Injection|       82       |    135/88    |  +--------+--------------+----------------+--------------+  | 04:00  |Post Injection|       80       |    121/87    |  +--------+--------------+----------------+--------------+  | 05:00  |   Recovery   |       77       |    128/84    |  +--------+--------------+----------------+--------------+  | 06:00  |   Recovery   |       75       |    124/81    |  +--------+--------------+----------------+--------------+  | 07:00  |  Recovery   |       77       |    131/74    |  +--------+--------------+----------------+--------------+  | 08:00  |   Recovery   |       73       |    121/81    |  +--------+--------------+----------------+--------------+    ---------------------------------------------------------------------------------------------------------------------------------------------------------Electrocardiogram:  Baseline electrocardiogram: Sinus rhythm at a rate of 61 bpm.  Stress electrocardiogram: No ischemic ST segment changes.  Arrhythmias: No arrhythmia associated with stress.       Heart rate and blood pressure:  The heart rate response for the pharmacologic stress test was normal.  ---------------------------------------------------------------------------------------------------------------------------------------------------------  Procedure:  The patient was given 8.23 mCi of TC-99M Sestamibi intravenously at rest on 3/6/2025 at 9:30:00 AM. Approximately 60 minutes later, solid state SPECT images were obtained, with patient in supine position. 24.97 mCi of TC-99M Sestamibi was given intravenously at stress on 3/6/2025 at 10:45:00 AM. After 80 min, solid state gated SPECT images were obtained and assessed for myocardial perfusion and function, with patient in supine position.  ---------------------------------------------------------------------------------------------------------------------------------------------------------  Perfusion:  Qualitative Findings:  There are small-sized, mild defect(s) in the distal anterior and apical lateral walls that are reversible consistent with ischemia. There is a small-sized, moderate defect(s) in the basal inferolateral wall that is fixed consistent with an infarct.                   SSS  SRS     Summed Score:  4    2       Quantitative Imaging Findings    +----------------+  |LVEF Post Stress|  +----------------+  |      66 %      |  +----------------+    ---------------------------------------------------------------------------------------------------------------------------------------------------------     Ventricular Function: The left ventricle is normal in function and normal in size. The post stress left ventricular ejection fraction is 66 %. The post stress end diastolic volume is 103 ml and systolic volume is 35 ml.     Interpreting Provider: Electronically signed on 3/6/2025 at 1:07:51 PM by Asad Teague         *** Final ***    < end of copied text >  OTHER: 	          
Subjective: Patient seen and examined. No new events except as noted.   feels ok     REVIEW OF SYSTEMS:    CONSTITUTIONAL: No weakness, fevers or chills  EYES/ENT: No visual changes;  No vertigo or throat pain   NECK: No pain or stiffness  RESPIRATORY: No cough, wheezing, hemoptysis; No shortness of breath  CARDIOVASCULAR: No chest pain or palpitations  GASTROINTESTINAL: No abdominal or epigastric pain. No nausea, vomiting, or hematemesis; No diarrhea or constipation. No melena or hematochezia.  GENITOURINARY: No dysuria, frequency or hematuria  NEUROLOGICAL: No numbness or weakness  SKIN: No itching, burning, rashes, or lesions   All other review of systems is negative unless indicated above.    MEDICATIONS:  MEDICATIONS  (STANDING):  amLODIPine   Tablet 5 milliGRAM(s) Oral daily  aspirin enteric coated 81 milliGRAM(s) Oral daily  enoxaparin Injectable 40 milliGRAM(s) SubCutaneous every 24 hours  hydrochlorothiazide 25 milliGRAM(s) Oral daily  pantoprazole    Tablet 40 milliGRAM(s) Oral before breakfast  rosuvastatin 5 milliGRAM(s) Oral at bedtime  valsartan 160 milliGRAM(s) Oral daily      PHYSICAL EXAM:  T(C): 36.6 (03-07-25 @ 04:54), Max: 36.8 (03-06-25 @ 21:36)  HR: 63 (03-07-25 @ 04:54) (63 - 65)  BP: 110/73 (03-07-25 @ 04:54) (106/68 - 110/73)  RR: 18 (03-07-25 @ 04:54) (18 - 18)  SpO2: 96% (03-07-25 @ 04:54) (94% - 96%)  Wt(kg): --  I&O's Summary    06 Mar 2025 07:01  -  07 Mar 2025 07:00  --------------------------------------------------------  IN: 240 mL / OUT: 0 mL / NET: 240 mL          Appearance: Normal	  HEENT:   Normal oral mucosa, PERRL, EOMI	  Lymphatic: No lymphadenopathy , no edema  Cardiovascular: Normal S1 S2, No JVD, No murmurs , Peripheral pulses palpable 2+ bilaterally  Respiratory: Lungs clear to auscultation, normal effort 	  Gastrointestinal:  Soft, Non-tender, + BS	  Skin: No rashes, No ecchymoses, No cyanosis, warm to touch  Musculoskeletal: Normal range of motion, normal strength  Psychiatry:  Mood & affect appropriate  Ext: No edema      LABS:    CARDIAC MARKERS:                                11.8   6.08  )-----------( 282      ( 06 Mar 2025 06:17 )             36.2     03-06    140  |  103  |  12  ----------------------------<  92  3.8   |  24  |  0.78    Ca    9.5      06 Mar 2025 06:17    TPro  8.0  /  Alb  4.1  /  TBili  0.3  /  DBili  x   /  AST  16  /  ALT  14  /  AlkPhos  61  03-05    proBNP:   Lipid Profile:   HgA1c:   TSH:             TELEMETRY: 	SR     ECG:  	  RADIOLOGY:   DIAGNOSTIC TESTING:  [ ] Echocardiogram:  [ ]  Catheterization:  [ ] Stress Test:   < from: Nuclear Stress Test-Pharmacologic.. (03.06.25 @ 10:17) >  Nuclear Pharmacologic Stress Test Report         Patient: RADHIKA VILLATORO             Study Date: 3/6/2025           MRN: [id number]                 Birth Date/Age: 1960 Age: 64 years      Access #: [id number]                          Gender: F     Order Loc: 2DSU                               Height: 165.1 cm/ 65.0 in  Request Phys: [id number] Dre                  Weight: 108.86 kg/ 240.00 lb                Alla     Procedure: Rest|Stress Pharmacologic        BSA/ BMI: 2.14m²/ 39.94 kg/m²    Indication: Chest Pain - R07.9          Admiss Status: Inpatient    Fellow/ACP: Rosa Chilel                   Fellow/ACP:    ---------------------------------------------------------------------------------------------------------------------------------------------------------  Procedure Code: MYOCARDIAL SPECT MULTIPLE - 04270.m;TC 99M SESTAMIBI PER DOSE -                  .m;INJECTION REGADENOSON - .m;TC 99M SESTAMIBI PER                  DOSE 2nd - .m;STRESS TEST TRACING ONLY - 01075.m    ---------------------------------------------------------------------------------------------------------------------------------------------------------  History:       64 year old female h/o HTN, HLD presents for ischemic evalaution                 of chest pain and jaw pain.  Risk Factors:  Hypertension and dyslipidemia.  Image Quality: Good  Medications:   Aspirin, amlodipine, HCTZ, pantoprazole, crestor, valsartan,                 lovenox.  Allergies:     PCN and shellfish    --------------------------------------------------------------------------------------------------------------------------------------------------------Conclusions:   1. Myocardial Perfusion: Abnormal.   2. Qualitative Perfusion:      -small-sized, mild defect(s) in the distal anterior and apical lateral walls that are reversible consistent with ischemia. - small-sized, moderate defect(s) in the basal inferolateral wall that is fixed consistent with an infarct.   3. The left ventricle is normal in function and normal in size. The post stress left ventricular EF is 66 %. The stress end diastolic volume is 103 ml and systolic volume is 35 ml.    ---------------------------------------------------------------------------------------------------------------------------------------------------------     Stress Test Results:          Heart Rate: Resting 61 bpm  Pretest Chest Pain: None.       Pharmacological Stress Test Results:                Protocol: IV regadenoson (bolus injection, 400mcg over 10 to 20                          seconds followed by 5ml flush)          Procedure Time: 4 minutes              Heart Rate: Resting 61 bpm, Stress peak 86 bpm and (55 % max                          predicted).             HR Response: Normal.          Blood Pressure: Resting 142/92 mmHg and Stress max 141/82 mmHg.  Symptoms During Stress: Jaw pain; shortness of breath and headache.  Reason for Termination: Completion of protocol.       Protocol: Regadenoson Injection  +--------+--------------+----------------+--------------+  |  Time  |              |Heart Rate (bpm)|Blood Pressure|  +--------+--------------+----------------+--------------+  |Baseline|Pre-Injection |       61       |    142/92    |  +--------+--------------+----------------+--------------+  | 01:00  |Post Injection|       78       |    141/82    |  +--------+--------------+----------------+--------------+  | 02:00  |Post Injection|       85       |    135/86    |  +--------+--------------+----------------+--------------+  | 03:00  |Post Injection|       82       |    135/88    |  +--------+--------------+----------------+--------------+  | 04:00  |Post Injection|       80       |    121/87    |  +--------+--------------+----------------+--------------+  | 05:00  |   Recovery   |       77       |    128/84    |  +--------+--------------+----------------+--------------+  | 06:00  |   Recovery   |       75       |    124/81    |  +--------+--------------+----------------+--------------+  | 07:00  |  Recovery   |       77       |    131/74    |  +--------+--------------+----------------+--------------+  | 08:00  |   Recovery   |       73       |    121/81    |  +--------+--------------+----------------+--------------+    ---------------------------------------------------------------------------------------------------------------------------------------------------------Electrocardiogram:  Baseline electrocardiogram: Sinus rhythm at a rate of 61 bpm.  Stress electrocardiogram: No ischemic ST segment changes.  Arrhythmias: No arrhythmia associated with stress.       Heart rate and blood pressure:  The heart rate response for the pharmacologic stress test was normal.  ---------------------------------------------------------------------------------------------------------------------------------------------------------  Procedure:  The patient was given 8.23 mCi of TC-99M Sestamibi intravenously at rest on 3/6/2025 at 9:30:00 AM. Approximately 60 minutes later, solid state SPECT images were obtained, with patient in supine position. 24.97 mCi of TC-99M Sestamibi was given intravenously at stress on 3/6/2025 at 10:45:00 AM. After 80 min, solid state gated SPECT images were obtained and assessed for myocardial perfusion and function, with patient in supine position.  ---------------------------------------------------------------------------------------------------------------------------------------------------------  Perfusion:  Qualitative Findings:  There are small-sized, mild defect(s) in the distal anterior and apical lateral walls that are reversible consistent with ischemia. There is a small-sized, moderate defect(s) in the basal inferolateral wall that is fixed consistent with an infarct.                   SSS  SRS     Summed Score:  4    2       Quantitative Imaging Findings    +----------------+  |LVEF Post Stress|  +----------------+  |      66 %      |  +----------------+    ---------------------------------------------------------------------------------------------------------------------------------------------------------     Ventricular Function: The left ventricle is normal in function and normal in size. The post stress left ventricular ejection fraction is 66 %. The post stress end diastolic volume is 103 ml and systolic volume is 35 ml.     Interpreting Provider: Electronically signed on 3/6/2025 at 1:07:51 PM by Asad Teague         *** Final ***    < end of copied text >    OTHER:

## 2025-03-07 NOTE — PROGRESS NOTE ADULT - PROBLEM SELECTOR PLAN 1
trops neg  EKG showed slight ST elevations in Avl, now resolved  TTE wnl  Abnormal stress test moderate defects in the basal inferolateral wall that is fixed consistent with infarct.  Plan for cardiac cath today   all risks and benefits discussed with her at length   she wants to proceed
trops neg  EKG showed slight ST elevations in Avl, now resolved  TTE wnl  Abnormal stress test moderate defects in the basal inferolateral wall that is fixed consistent with infarct.  Plan for cardiac cath

## 2025-03-10 ENCOUNTER — TRANSCRIPTION ENCOUNTER (OUTPATIENT)
Age: 65
End: 2025-03-10

## 2025-03-25 ENCOUNTER — APPOINTMENT (OUTPATIENT)
Dept: ORTHOPEDIC SURGERY | Facility: CLINIC | Age: 65
End: 2025-03-25
Payer: OTHER MISCELLANEOUS

## 2025-03-25 DIAGNOSIS — S80.01XA CONTUSION OF RIGHT KNEE, INITIAL ENCOUNTER: ICD-10-CM

## 2025-03-25 PROCEDURE — 99204 OFFICE O/P NEW MOD 45 MIN: CPT

## 2025-03-31 ENCOUNTER — APPOINTMENT (OUTPATIENT)
Dept: ORTHOPEDIC SURGERY | Facility: CLINIC | Age: 65
End: 2025-03-31

## 2025-05-07 ENCOUNTER — APPOINTMENT (OUTPATIENT)
Dept: ORTHOPEDIC SURGERY | Facility: CLINIC | Age: 65
End: 2025-05-07

## 2025-05-12 ENCOUNTER — APPOINTMENT (OUTPATIENT)
Dept: ORTHOPEDIC SURGERY | Facility: CLINIC | Age: 65
End: 2025-05-12

## 2025-05-19 ENCOUNTER — APPOINTMENT (OUTPATIENT)
Age: 65
End: 2025-05-19
Payer: COMMERCIAL

## 2025-05-19 ENCOUNTER — NON-APPOINTMENT (OUTPATIENT)
Age: 65
End: 2025-05-19

## 2025-05-19 VITALS — BODY MASS INDEX: 39.71 KG/M2 | WEIGHT: 253 LBS | HEIGHT: 67 IN

## 2025-05-19 DIAGNOSIS — M79.674 PAIN IN RIGHT TOE(S): ICD-10-CM

## 2025-05-19 DIAGNOSIS — M79.675 PAIN IN LEFT TOE(S): ICD-10-CM

## 2025-05-19 DIAGNOSIS — B35.1 TINEA UNGUIUM: ICD-10-CM

## 2025-05-19 DIAGNOSIS — L85.3 XEROSIS CUTIS: ICD-10-CM

## 2025-05-19 PROCEDURE — 11721 DEBRIDE NAIL 6 OR MORE: CPT

## 2025-05-19 PROCEDURE — 99203 OFFICE O/P NEW LOW 30 MIN: CPT | Mod: 25

## 2025-05-19 RX ORDER — AMMONIUM LACTATE 12 %
12 CREAM (GRAM) TOPICAL TWICE DAILY
Qty: 400 | Refills: 1 | Status: ACTIVE | COMMUNITY
Start: 2025-05-19 | End: 1900-01-01

## 2025-05-20 ENCOUNTER — APPOINTMENT (OUTPATIENT)
Dept: ORTHOPEDIC SURGERY | Facility: CLINIC | Age: 65
End: 2025-05-20

## 2025-05-27 ENCOUNTER — APPOINTMENT (OUTPATIENT)
Dept: ORTHOPEDIC SURGERY | Facility: CLINIC | Age: 65
End: 2025-05-27

## 2025-05-29 ENCOUNTER — APPOINTMENT (OUTPATIENT)
Dept: ORTHOPEDIC SURGERY | Facility: CLINIC | Age: 65
End: 2025-05-29
Payer: OTHER MISCELLANEOUS

## 2025-05-29 VITALS — BODY MASS INDEX: 39.71 KG/M2 | HEIGHT: 67 IN | WEIGHT: 253 LBS

## 2025-05-29 DIAGNOSIS — M54.12 RADICULOPATHY, CERVICAL REGION: ICD-10-CM

## 2025-05-29 DIAGNOSIS — M43.17 SPONDYLOLISTHESIS, LUMBOSACRAL REGION: ICD-10-CM

## 2025-05-29 PROCEDURE — 99213 OFFICE O/P EST LOW 20 MIN: CPT

## 2025-07-11 ENCOUNTER — APPOINTMENT (OUTPATIENT)
Dept: ORTHOPEDIC SURGERY | Facility: CLINIC | Age: 65
End: 2025-07-11

## 2025-07-14 ENCOUNTER — APPOINTMENT (OUTPATIENT)
Dept: ORTHOPEDIC SURGERY | Facility: CLINIC | Age: 65
End: 2025-07-14
Payer: OTHER MISCELLANEOUS

## 2025-07-14 VITALS — WEIGHT: 253 LBS | HEIGHT: 67 IN | BODY MASS INDEX: 39.71 KG/M2

## 2025-07-14 PROCEDURE — 99214 OFFICE O/P EST MOD 30 MIN: CPT

## 2025-07-14 RX ORDER — MELOXICAM 15 MG/1
15 TABLET ORAL
Qty: 30 | Refills: 1 | Status: ACTIVE | COMMUNITY
Start: 2025-07-14 | End: 1900-01-01

## 2025-07-31 ENCOUNTER — APPOINTMENT (OUTPATIENT)
Dept: ORTHOPEDIC SURGERY | Facility: CLINIC | Age: 65
End: 2025-07-31

## 2025-08-18 ENCOUNTER — APPOINTMENT (OUTPATIENT)
Age: 65
End: 2025-08-18